# Patient Record
Sex: FEMALE
[De-identification: names, ages, dates, MRNs, and addresses within clinical notes are randomized per-mention and may not be internally consistent; named-entity substitution may affect disease eponyms.]

---

## 2020-02-08 ENCOUNTER — HOSPITAL ENCOUNTER (EMERGENCY)
Dept: HOSPITAL 97 - ER | Age: 45
Discharge: HOME | End: 2020-02-08
Payer: SELF-PAY

## 2020-02-08 VITALS — TEMPERATURE: 99.2 F | DIASTOLIC BLOOD PRESSURE: 90 MMHG | SYSTOLIC BLOOD PRESSURE: 164 MMHG

## 2020-02-08 VITALS — OXYGEN SATURATION: 100 %

## 2020-02-08 DIAGNOSIS — M54.5: Primary | ICD-10-CM

## 2020-02-08 LAB
ALBUMIN SERPL BCP-MCNC: 4 G/DL (ref 3.4–5)
ALP SERPL-CCNC: 72 U/L (ref 45–117)
ALT SERPL W P-5'-P-CCNC: 21 U/L (ref 12–78)
AST SERPL W P-5'-P-CCNC: 18 U/L (ref 15–37)
BUN BLD-MCNC: 3 MG/DL (ref 7–18)
GLUCOSE SERPLBLD-MCNC: 117 MG/DL (ref 74–106)
HCT VFR BLD CALC: 35.8 % (ref 36–45)
LIPASE SERPL-CCNC: 53 U/L (ref 73–393)
LYMPHOCYTES # SPEC AUTO: 0.4 K/UL (ref 0.7–4.9)
PMV BLD: 7.9 FL (ref 7.6–11.3)
POTASSIUM SERPL-SCNC: 3.4 MMOL/L (ref 3.5–5.1)
RBC # BLD: 4.47 M/UL (ref 3.86–4.86)
UA COMPLETE W REFLEX CULTURE PNL UR: (no result)

## 2020-02-08 PROCEDURE — 85025 COMPLETE CBC W/AUTO DIFF WBC: CPT

## 2020-02-08 PROCEDURE — 74176 CT ABD & PELVIS W/O CONTRAST: CPT

## 2020-02-08 PROCEDURE — 81015 MICROSCOPIC EXAM OF URINE: CPT

## 2020-02-08 PROCEDURE — 76377 3D RENDER W/INTRP POSTPROCES: CPT

## 2020-02-08 PROCEDURE — 80076 HEPATIC FUNCTION PANEL: CPT

## 2020-02-08 PROCEDURE — 83690 ASSAY OF LIPASE: CPT

## 2020-02-08 PROCEDURE — 96361 HYDRATE IV INFUSION ADD-ON: CPT

## 2020-02-08 PROCEDURE — 81025 URINE PREGNANCY TEST: CPT

## 2020-02-08 PROCEDURE — 96374 THER/PROPH/DIAG INJ IV PUSH: CPT

## 2020-02-08 PROCEDURE — 96375 TX/PRO/DX INJ NEW DRUG ADDON: CPT

## 2020-02-08 PROCEDURE — 99284 EMERGENCY DEPT VISIT MOD MDM: CPT

## 2020-02-08 PROCEDURE — 80048 BASIC METABOLIC PNL TOTAL CA: CPT

## 2020-02-08 PROCEDURE — 36415 COLL VENOUS BLD VENIPUNCTURE: CPT

## 2020-02-08 PROCEDURE — 81003 URINALYSIS AUTO W/O SCOPE: CPT

## 2020-02-08 NOTE — ER
Nurse's Notes                                                                                     

 [facility]                                                                 

Name: Taylor Cervantes                                                                 

Age: 44 yrs                                                                                       

Sex: Female                                                                                       

: 1975                                                                                   

MRN: [id number]                                                                                   

Arrival Date: 2020                                                                          

Time: 18:21                                                                                       

Account#: [id number]                                                                            

Bed 19                                                                                            

Private MD:                                                                                       

Diagnosis: Low back pain-right                                                                    

                                                                                                  

Presentation:                                                                                     

                                                                                             

18:24 Presenting complaint: Low back and left flank pain, diarrhea, and burning with          hb  

      urination x 1 week. Denies fever. Transition of care: patient was not received from         

      another setting of care. Onset of symptoms was 2020. Risk Assessment: Do       

      you want to hurt yourself or someone else? Patient reports no desire to harm self or        

      others. Care prior to arrival: Medication(s) given: XL3 at 1300.                            

18:24 Method Of Arrival: Ambulatory                                                           hb  

18:24 Acuity: ANABEL 3                                                                           hb  

20:40 Initial Sepsis Screen: Does the patient meet any 2 criteria? No. Patient's initial      jd3 

      sepsis screen is negative. Does the patient have a suspected source of infection? No.       

      Patient's initial sepsis screen is negative.                                                

                                                                                                  

OB/GYN:                                                                                           

20:41 LMP N/A - Irregular menses                                                              jd3 

                                                                                                  

Historical:                                                                                       

- Allergies:                                                                                      

18:27 No Known Allergies;                                                                     hb  

- Home Meds:                                                                                      

18:27 None [Active];                                                                          hb  

- PMHx:                                                                                           

18:27 None;                                                                                   hb  

- PSHx:                                                                                           

18:27 ;                                                                              hb  

                                                                                                  

- Immunization history:: Adult Immunizations up to date.                                          

- Coronavirus screen:: The patient has NOT traveled to [place], [place], or [place] in the           

  past 14 days. The patient has NOT had contact with known/suspected case of                      

  Coronavirus? Proceed with normal triage procedures.                                             

- Social history:: Smoking status: Patient denies any tobacco usage or history of.                

- Ebola Screening: : No symptoms or risks identified at this time.                                

                                                                                                  

                                                                                                  

Screenin:30 Abuse screen: Denies threats or abuse. Nutritional screening: No deficits noted.        rb1 

      Tuberculosis screening: No symptoms or risk factors identified. Fall Risk None              

      identified.                                                                                 

                                                                                                  

Assessment:                                                                                       

18:30 General: Appears uncomfortable, Behavior is calm, cooperative, Denies fever. Pain:      rb1 

      Complains of pain in right flank Pain radiates to right leg Pain currently is 9 out of      

      10 on a pain scale. Pain began x 1 week. Neuro: Level of Consciousness is awake, alert,     

      obeys commands, Oriented to person, place, time, situation. Cardiovascular: Capillary       

      refill < 3 seconds is brisk in bilateral fingers. Respiratory: Airway is patent             

      Respiratory effort is even, unlabored, Respiratory pattern is regular, symmetrical. GI:     

      Reports diarrhea, since this morning. : Reports burning with urination. Derm: Skin is     

      pink, warm \T\ dry. Musculoskeletal: Range of motion: intact in all extremities.            

19:47 General: Appears in no apparent distress. uncomfortable, Behavior is calm, cooperative, jd3 

      appropriate for age. Pain: Complains of pain in right low back Pain radiates to right       

      leg Quality of pain is described as shooting. Neuro: Level of Consciousness is awake,       

      alert, obeys commands, Oriented to person, place, time, situation. Cardiovascular:          

      Capillary refill < 3 seconds Patient's skin is warm and dry. Respiratory: Airway is         

      patent Respiratory effort is even, unlabored, Respiratory pattern is regular,               

      symmetrical, Denies cough, shortness of breath. GI: Reports diarrhea, Patient currently     

      denies nausea, vomiting. : Reports burning with urination. EENT: No signs and/or          

      symptoms were reported regarding the EENT system. Derm: Skin is intact, Skin is dry,        

      Skin is normal, Skin temperature is warm. Musculoskeletal: Circulation, motion, and         

      sensation intact. Range of motion: intact in all extremities.                               

20:41 Reassessment: Patient appears in no apparent distress at this time. Patient and/or      jd3 

      family updated on plan of care and expected duration. Pain level reassessed. Patient is     

      alert, oriented x 3, equal unlabored respirations, skin warm/dry/pink. pt reported          

      understanding of discharge instructions. even and steady gait upon discharge.               

                                                                                                  

Vital Signs:                                                                                      

18:23  / 90; Pulse 96; Resp 16; Temp 99.2(TE); Pulse Ox 100% on R/A; Weight 63.5 kg;    hb  

      Height 5 ft. (152.40 cm); Pain 9/10;                                                        

19:48 Pulse 92; Resp 16 S; Pulse Ox 100% on R/A;                                              jd3 

18:23 Body Mass Index 27.34 (63.50 kg, 152.40 cm)                                             hb  

                                                                                                  

ED Course:                                                                                        

18:21 Patient arrived in ED.                                                                  as  

18:23 Arm band placed on.                                                                     hb  

18:27 Triage completed.                                                                       hb  

18:29 Mesfin Ram PA is PHCP.                                                                cp  

18:29 Brock Cristina MD is Attending Physician.                                                cp  

18:30 Patient has correct armband on for positive identification. Bed in low position. Call   rb1 

      light in reach. Side rails up X 1. Pulse ox on. NIBP on.                                    

18:48 Urine collected: clean catch specimen, clear, earlene colored.                            jb1 

18:57 Pauline Jones, RN is Primary Nurse.                                                   rb1 

19:06 CT completed. Patient tolerated procedure well. Patient moved back from CT.             bq  

19:18 Initial lab(s) drawn, by me, sent to lab. Inserted saline lock: 22 gauge in right       kj1 

      antecubital area, using aseptic technique. Blood collected.                                 

19:20 CT Stone Protocol In Process Unspecified.                                               EDMS

20:40 No provider procedures requiring assistance completed. IV discontinued, intact,         jd3 

      bleeding controlled, No redness/swelling at site. Pressure dressing applied.                

                                                                                                  

Administered Medications:                                                                         

19:43 Drug: NS 0.9% 1000 ml Route: IV; Rate: 1 bolus; Site: right antecubital;                jd3 

20:40 Follow up: Response: No adverse reaction; IV Status: Completed infusion; IV Intake:     jd3 

      1000ml                                                                                      

19:43 Drug: TORadol - Ketorolac 15 mg Route: IVP; Site: right antecubital;                    jd3 

20:10 Follow up: Response: No adverse reaction; Pain is unchanged, physician notified         jd3 

20:10 Drug: fentaNYL (PF) 25 mcg Route: IVP; Site: right antecubital;                         jd3 

20:39 Follow up: Response: No adverse reaction; RASS: Alert and Calm (0)                      jd3 

                                                                                                  

                                                                                                  

Intake:                                                                                           

20:40 IV: 1000ml; Total: 1000ml.                                                              jd3 

                                                                                                  

Outcome:                                                                                          

20:29 Discharge ordered by MD.                                                                cp  

20:40 Discharged to home ambulatory, with family.                                             jd3 

20:40 Condition: stable                                                                           

20:40 Discharge instructions given to patient, family, Instructed on discharge instructions,      

      follow up and referral plans. medication usage, Demonstrated understanding of               

      instructions, follow-up care, medications, Prescriptions given X X 5                        

20:42 Patient left the ED.                                                                    jd3 

                                                                                                  

Signatures:                                                                                       

Dispatcher [employer]                           EDMS                                                 

Kyle Chang                                 jb1                                                  

Nissa James                                bq                                                   

Josseline Cabrera                             as                                                   

Mesfin Ram PA                         PA   cp                                                   

Pauline Jones, RN                     RN   rb1                                                  

Acevedo, Abigail, RN                     RN   hb                                                   

Patricio Baird RN                    RN   jd3                                                  

Yudith Corcoran                              kj1                                                  

                                                                                                  

Corrections: (The following items were deleted from the chart)                                    

18:27 18:23  / 90; Pulse 96bpm; Resp 16bpm; Pulse Ox 100% RA; Temp 99.2F Temporal; 63.5 hb  

      kg; Height 5 ft.; BMI: 27.3; Pain 8/10; hb                                                  

                                                                                                  

**************************************************************************************************

## 2020-02-08 NOTE — RAD REPORT
EXAM DESCRIPTION:  CT - Stone Protocol - 2/8/2020 7:18 pm

 

CLINICAL HISTORY:  Abdominal pain. Right flank pain

 

COMPARISON:  None.

 

TECHNIQUE:  Computed axial tomography of the abdomen pelvis was obtained without oral or IV contrast.
 Lack of IV and oral contrast limits evaluation of solid organs, bowel, and vessels. Coronal reformat
anirduh images were obtained and reviewed.

 

All CT scans are performed using dose optimization technique as appropriate and may include automated
 exposure control or mA/KV adjustment according to patient size.

 

FINDINGS:  A renal calculus is not seen. An ureteral calculus is not noted. A bladder calculus is not
 present.

 

The liver, spleen, pancreas and adrenals appear grossly normal

 

There is no evidence of diverticulitis. The appendix appears normal

 

A 15 millimeter area sclerosis within the proximal right femur

 

Minimal amount of free fluid may be physiologic

 

IMPRESSION:  Negative for a genitourinary calculus

 

15 millimeter area sclerosis within the proximal right femur is nonspecific. It may represent a bone 
island. It is recommended that the patient have a followup x-ray right femur in 3 months to assess st
ability

## 2020-02-08 NOTE — EDPHYS
Physician Documentation                                                                           

 [facility]                                                                 

Name: Taylor Cervantes                                                                 

Age: 44 yrs                                                                                       

Sex: Female                                                                                       

: 1975                                                                                   

MRN: [id number]                                                                                   

Arrival Date: 2020                                                                          

Time: 18:21                                                                                       

Account#: [id number]                                                                            

Bed 19                                                                                            

Private MD:                                                                                       

ED Physician Brock Cristina                                                                         

HPI:                                                                                              

                                                                                             

18:37 This 44 yrs old  Female presents to ER via Ambulatory with complaints of Flank  cp  

      Pain.                                                                                       

18:37 The patient complains of pain in the right mid back and right low back. The pain        cp  

      radiates to the right buttock. Onset: The symptoms/episode began/occurred 1 week(s) ago.    

18:37 Associated signs and symptoms: Pertinent positives: diarrhea, radiating pain to right   cp  

      buttock, Pertinent negatives: fever, hematuria, vomiting. Severity of pain: in the          

      emergency department the pain is unchanged despite home interventions.                      

18:37 The patient has not experienced similar symptoms in the past.                           cp  

                                                                                                  

OB/GYN:                                                                                           

20:41 LMP N/A - Irregular menses                                                              jd3 

                                                                                                  

Historical:                                                                                       

- Allergies:                                                                                      

18:27 No Known Allergies;                                                                     hb  

- Home Meds:                                                                                      

18:27 None [Active];                                                                          hb  

- PMHx:                                                                                           

18:27 None;                                                                                   hb  

- PSHx:                                                                                           

18:27 ;                                                                              hb  

                                                                                                  

- Immunization history:: Adult Immunizations up to date.                                          

- Coronavirus screen:: The patient has NOT traveled to [place], [place], or [place] in the           

  past 14 days. The patient has NOT had contact with known/suspected case of                      

  Coronavirus? Proceed with normal triage procedures.                                             

- Social history:: Smoking status: Patient denies any tobacco usage or history of.                

- Ebola Screening: : No symptoms or risks identified at this time.                                

                                                                                                  

                                                                                                  

ROS:                                                                                              

18:45 Constitutional: Negative for body aches, chills, fever.                                 cp  

18:45 Eyes: Negative for injury, pain, redness, and discharge.                                cp  

18:45 ENT: Negative for drainage from ear(s), ear pain, sore throat, difficulty swallowing,   cp  

      difficulty handling secretions.                                                             

18:45 Cardiovascular: Negative for chest pain, edema, palpitations.                               

18:45 Respiratory: Negative for cough, shortness of breath, wheezing.                             

18:45 Abdomen/GI: Positive for diarrhea, Negative for abdominal pain, vomiting, constipation,     

      anorexia, bowel incontinence.                                                               

18:45 Back: Positive for flank pain, on the right, Negative for injury or acute deformity.        

18:45 : Negative for difficulty urinating, bladder incontinence.                                

18:45 Skin: Negative for rash.                                                                    

18:45 Neuro: Negative for altered mental status, headache, weakness.                              

18:45 All other systems are negative.                                                             

                                                                                                  

Exam:                                                                                             

19:00 Constitutional: The patient appears in no acute distress, alert, awake, non-toxic, well cp  

      developed, well nourished, uncomfortable.                                                   

19:00 Head/Face:  Normocephalic, atraumatic.                                                  cp  

19:00 Eyes: Periorbital structures: appear normal, Conjunctiva: normal, no exudate, no            

      injection, Sclera: no appreciated abnormality, Lids and lashes: appear normal,              

      bilaterally.                                                                                

19:00 ENT: External ear(s): are unremarkable, Nose: is normal, Mouth: Lips: moist, Oral           

      mucosa: pink and intact, moist, Posterior pharynx: is normal, airway is patent, no          

      erythema, no exudate.                                                                       

19:00 Neck: ROM/movement: is normal, is supple, without pain, no range of motions                 

      limitations, no nuchal rigidity.                                                            

19:00 Chest/axilla: Inspection: normal.                                                           

19:00 Cardiovascular: Rate: normal, Rhythm: regular, Edema: is not appreciated, JVD: is not       

      appreciated.                                                                                

19:00 Respiratory: the patient does not display signs of respiratory distress,  Respirations:     

      normal, no use of accessory muscles, no retractions, no splinting, no tachypnea,            

      labored breathing, is not present, Breath sounds: are clear throughout, no decreased        

      breath sounds.                                                                              

19:00 Abdomen/GI: Inspection: abdomen appears normal, Bowel sounds: active, all quadrants,        

      Palpation: abdomen is soft and non-tender, in all quadrants.                                

19:00 Back: pain, that is moderate, of the  right mid back and right low back, ROM is             

      painful, with all movement, vertebral tenderness, is not appreciated, Straight leg          

      raises: of both lower extremities does not illicit pain.                                    

19:00 Skin: no rash present.                                                                      

19:00 Neuro: Orientation: to person, place \T\ time. Mentation: is normal, Motor: moves all       

      fours, strength is normal, Sensation: is normal, Gait: is steady, Deep tendon reflexes      

      are 2+ (normal) in the  right patellar, right Achilles, left patellar and left Achilles.    

                                                                                                  

Vital Signs:                                                                                      

18:23  / 90; Pulse 96; Resp 16; Temp 99.2(TE); Pulse Ox 100% on R/A; Weight 63.5 kg;    hb  

      Height 5 ft. (152.40 cm); Pain 9/10;                                                        

19:48 Pulse 92; Resp 16 S; Pulse Ox 100% on R/A;                                              jd3 

18:23 Body Mass Index 27.34 (63.50 kg, 152.40 cm)                                             hb  

                                                                                                  

MDM:                                                                                              

18:33 Patient medically screened.                                                             cp  

20:00 Differential diagnosis: nephrolithiasis, pyelonephritis, UTI, diverticulitis,           cp  

      pancreatitis, sciatica, spinal stenosis, cauda equina.                                      

20:28 Data reviewed: vital signs, nurses notes, lab test result(s), radiologic studies, CT    cp  

      scan, and as a result, I will discharge patient. Counseling: I had a detailed               

      discussion with the patient and/or guardian regarding: the historical points, exam          

      findings, and any diagnostic results supporting the discharge/admit diagnosis, lab          

      results, radiology results, the need for outpatient follow up, a family practitioner,       

      to return to the emergency department if symptoms worsen or persist or if there are any     

      questions or concerns that arise at home. Response to treatment: the patient's symptoms     

      have markedly improved after treatment, and as a result, I will discharge patient.          

                                                                                                  

                                                                                             

18:49 Order name: Basic Metabolic Panel; Complete Time: 20:                                 cp  

                                                                                             

20: Interpretation: Normal except: ; K 3.4; GLUC 117; BUN 3; CRE 0.54.                cp  

                                                                                             

18:49 Order name: CBC with Diff; Complete Time: 20:                                         cp  

                                                                                             

20: Interpretation: Normal except: HGB 11.5; HCT 35.8; MCH 25.8; RDW 17.2; BE% 82.8; LYM%  cp  

      8.2; LYMA 0.4.                                                                              

                                                                                             

18:49 Order name: Creatinine for Radiology; Complete Time: 20:                              cp  

                                                                                             

18:49 Order name: Hepatic Function; Complete Time: 20:                                      cp  

                                                                                             

20: Interpretation: Normal except: TP 8.6; GLOB 4.6; A/G 0.9.                               cp  

                                                                                             

18:49 Order name: Lipase; Complete Time: 20:                                                cp  

                                                                                             

20: Interpretation: LIP 53; Reviewed.                                                       cp  

                                                                                             

18:49 Order name: Urine Microscopic Only; Complete Time: 19:33                                cp  

                                                                                             

19:33 Interpretation: Reviewed.                                                               cp  

02                                                                                             

18:49 Order name: IV Saline Lock; Complete Time: 19:43                                        cp  

                                                                                             

18:49 Order name: Labs collected and sent; Complete Time: 19:43                               cp  

                                                                                             

18:49 Order name: CT Stone Protocol; Complete Time: 19:33                                     cp  

                                                                                             

19:34 Interpretation: Report reviewed.                                                        cp  

                                                                                             

18:57 Order name: Urine Dipstick--Ancillary (enter results); Complete Time: 19:33             eb  

                                                                                             

19:33 Interpretation: Normal except: UKET 4+; UBLD TRACE.                                     cp  

                                                                                             

18:57 Order name: Urine Pregnancy--Ancillary (enter results); Complete Time: 19:33            eb  

                                                                                             

18:49 Order name: Urine Dipstick-Ancillary (obtain specimen); Complete Time: 18:57            cp  

                                                                                             

18:49 Order name: Urine Pregnancy Test (obtain specimen); Complete Time: 18:57                cp  

                                                                                                  

Administered Medications:                                                                         

19:43 Drug: NS 0.9% 1000 ml Route: IV; Rate: 1 bolus; Site: right antecubital;                jd3 

20:40 Follow up: Response: No adverse reaction; IV Status: Completed infusion; IV Intake:     jd3 

      1000ml                                                                                      

19:43 Drug: TORadol - Ketorolac 15 mg Route: IVP; Site: right antecubital;                    jd3 

20:10 Follow up: Response: No adverse reaction; Pain is unchanged, physician notified         jd3 

20:10 Drug: fentaNYL (PF) 25 mcg Route: IVP; Site: right antecubital;                         jd3 

20:39 Follow up: Response: No adverse reaction; RASS: Alert and Calm (0)                      jd3 

                                                                                                  

                                                                                                  

Disposition:                                                                                      

                                                                                             

07:09 Co-signature as Attending Physician, Brock Cristina MD.                                    rn  

                                                                                                  

Disposition:                                                                                      

20 20:29 Discharged to Home. Impression: Low back pain - right.                             

- Condition is Stable.                                                                            

- Discharge Instructions: Back Pain, Adult, Heat Therapy, Back Exercises.                         

- Prescriptions for Lidoderm 5 % Topical adhesive patch,medicated - apply 1 patch by              

  TRANSDERMAL route once daily As needed; 1 box. Cyclobenzaprine 10 mg Oral Tablet -              

  take 1 tablet by ORAL route every 8 hours As needed no driving while taking                     

  medication; 20 tablet. Tramadol 50 mg Oral Tablet - take 1 tablet by ORAL route every           

  8 hours as needed; 12 tablet. Medrol (Norbert) 4 mg Oral Tablets, Dose Pack - take 1                

  tablet by ORAL route as directed - follow package instructions; 1 packet.                       

- Medication Reconciliation Form, Thank You Letter, Antibiotic Education, Prescription            

  Opioid Use form.                                                                                

- Follow up: Private Physician; When: 2 - 3 days; Reason: Recheck today's complaints.             

- Problem is new.                                                                                 

- Symptoms have improved.                                                                         

                                                                                                  

                                                                                                  

                                                                                                  

Signatures:                                                                                       

Dispatcher [employer]                           EDMS                                                 

Brock Cristina MD MD   rn                                                   

Mesfin Ram PA PA cp Baxter, Heather, RN                     RN                                                      

Patricio Baird RN                    RN   jd3                                                  

                                                                                                  

Corrections: (The following items were deleted from the chart)                                    

                                                                                             

20:42 20:29 2020 20:29 Discharged to Home. Impression: Low back pain - right. Condition jd3 

      is Stable. Forms are Medication Reconciliation Form, Thank You Letter, Antibiotic           

      Education, Prescription Opioid Use. Follow up: Private Physician; When: 2 - 3 days;         

      Reason: Recheck today's complaints. Problem is new. Symptoms have improved. cp              

                                                                                                  

**************************************************************************************************